# Patient Record
Sex: FEMALE | URBAN - METROPOLITAN AREA
[De-identification: names, ages, dates, MRNs, and addresses within clinical notes are randomized per-mention and may not be internally consistent; named-entity substitution may affect disease eponyms.]

---

## 2019-11-20 LAB — COLONOSCOPY, EXTERNAL: NORMAL

## 2022-12-02 ENCOUNTER — OFFICE VISIT (OUTPATIENT)
Dept: PRIMARY CARE CLINIC | Age: 62
End: 2022-12-02
Payer: OTHER GOVERNMENT

## 2022-12-02 VITALS
OXYGEN SATURATION: 99 % | TEMPERATURE: 97.6 F | DIASTOLIC BLOOD PRESSURE: 78 MMHG | SYSTOLIC BLOOD PRESSURE: 123 MMHG | HEIGHT: 66 IN | HEART RATE: 77 BPM | WEIGHT: 153.8 LBS | BODY MASS INDEX: 24.72 KG/M2 | RESPIRATION RATE: 14 BRPM

## 2022-12-02 DIAGNOSIS — E78.2 MIXED HYPERLIPIDEMIA: ICD-10-CM

## 2022-12-02 DIAGNOSIS — E03.9 ACQUIRED HYPOTHYROIDISM: Primary | ICD-10-CM

## 2022-12-02 DIAGNOSIS — Z86.2 HISTORY OF ANEMIA: ICD-10-CM

## 2022-12-02 RX ORDER — LEVOTHYROXINE SODIUM 112 UG/1
112 TABLET ORAL DAILY
COMMUNITY
Start: 2022-10-29 | End: 2022-12-02 | Stop reason: SDUPTHER

## 2022-12-02 RX ORDER — LEVOTHYROXINE SODIUM 112 UG/1
112 TABLET ORAL DAILY
Qty: 90 TABLET | Refills: 3 | Status: SHIPPED | OUTPATIENT
Start: 2022-12-02

## 2022-12-02 NOTE — PROGRESS NOTES
Chief Complaint   Patient presents with    Establish Care     Visit Vitals  /78 (BP 1 Location: Left upper arm, BP Patient Position: Sitting, BP Cuff Size: Small adult)   Pulse 77   Temp 97.6 °F (36.4 °C) (Temporal)   Resp 14   Ht 5' 6\" (1.676 m)   Wt 153 lb 12.8 oz (69.8 kg)   SpO2 99%   BMI 24.82 kg/m²     1. \"Have you been to the ER, urgent care clinic since your last visit? Hospitalized since your last visit? \" no    2. \"Have you seen or consulted any other health care providers outside of the [address] [place] [address] since your last visit? \" no     3. For patients aged 39-70: Has the patient had a colonoscopy / FIT/ Cologuard? Up to date       If the patient is female:    4. For patients aged 41-77: Has the patient had a mammogram within the past 2 years?  Due this month

## 2022-12-02 NOTE — PROGRESS NOTES
HPI     Chief Complaint   Patient presents with    [facility] Care     She is a 60 y.o. female who presents to [facility]. Establishing Care    Pmhx : hypothyroidism. Surghx reviewed. Sochx : quit smoking > 20 years ago, light smoker for 20 years. No significant alcohol use. Exercises at [employer] 4 days per day. Diet is healthy. Hx hypothyroidism. She reports compliance with medication. Denies any concerns or acute complaints. Mood and energy has been good. No exercise intolerance. She is UTD on cervical and colon cancer screenings. Has mammo scheduled. Retired. Moved to [facility] last year from [place]. Has grown children that live in [place] and [place].     - Chronic medical problems:  Past Medical History:   Diagnosis Date    Thyroid disease      - Current medications:   Current Outpatient Medications   Medication Sig    LevoxyL 112 mcg tablet Take 112 mcg by mouth daily. No current facility-administered medications for this visit. - Family history: History reviewed. No pertinent family history.   - Allergies: No Known Allergies  - Surgical history:   Past Surgical History:   Procedure Laterality Date    HX TONSILLECTOMY       - Social history (sexually active, occupation, smoker, etoh use, etc):   Social History     Socioeconomic History    Marital status: UNKNOWN     Spouse name: Not on file    Number of children: Not on file    Years of education: Not on file    Highest education level: Not on file   Occupational History    Not on file   Tobacco Use    Smoking status: Former     Types: Cigarettes    Smokeless tobacco: Never   Substance and Sexual Activity    Alcohol use: Not on file    Drug use: Never    Sexual activity: Not on file   Other Topics Concern    Not on file   Social History Narrative    Not on file     Social Determinants of Health     Financial Resource Strain: Not on file   Food Insecurity: Not on file   Transportation Needs: Not on file   Physical Activity: Not on file Stress: Not on file   Social Connections: Not on file   Intimate Partner Violence: Not on file   Housing Stability: Not on file         Review of Systems  General : Denies fever, chills, unintentional weight loss. Cardiac : Denies chest pain, shortness of breath, orthopnea, PND. Pulm : Denies coughing, hemoptysis, dyspnea. GI: Denies abd pain, vomiting, change in bowel movements, black/bloody stool. Neuro : Denies syncope or presyncope. Denies focal neuro symptoms. Reviewed PmHx, RxHx, FmHx, SocHx, AllgHx and updated and dated in the chart. Physical Exam:  Visit Vitals  /78 (BP 1 Location: Left upper arm, BP Patient Position: Sitting, BP Cuff Size: Small adult)   Pulse 77   Temp 97.6 °F (36.4 °C) (Temporal)   Resp 14   Ht 5' 6\" (1.676 m)   Wt 153 lb 12.8 oz (69.8 kg)   SpO2 99%   BMI 24.82 kg/m²     Physical Exam  Vitals reviewed. Constitutional:       Appearance: Normal appearance. HENT:      Head: Normocephalic and atraumatic. Right Ear: Tympanic membrane, ear canal and external ear normal.      Left Ear: Tympanic membrane, ear canal and external ear normal.   Eyes:      Conjunctiva/sclera: Conjunctivae normal.      Pupils: Pupils are equal, round, and reactive to light. Cardiovascular:      Rate and Rhythm: Normal rate and regular rhythm. Pulses: Normal pulses. Heart sounds: Normal heart sounds. Pulmonary:      Effort: Pulmonary effort is normal.      Breath sounds: Normal breath sounds. Musculoskeletal:      Cervical back: Neck supple. No tenderness. Lymphadenopathy:      Cervical: No cervical adenopathy. Neurological:      Mental Status: She is alert. Assessment / Plan     Diagnoses and all orders for this visit:    1. Acquired hypothyroidism  -     METABOLIC PANEL, COMPREHENSIVE; Future  -     THYROID CASCADE PROFILE; Future    2. History of anemia  -     CBC WITH AUTOMATED DIFF; Future    3. Mixed hyperlipidemia  -     LIPID PANEL;  Future    Other orders  -     LevoxyL 112 mcg tablet; Take 1 Tablet by mouth daily. Previous notes reviewed. Labs and follow up as indicated. Refill levothyroxine. Follow up 12 months or sooner prn. Obtain medical records    I have discussed the diagnosis with the patient and the intended plan as seen in the above orders.       Bridgette Friday, DO

## 2022-12-08 ENCOUNTER — TELEPHONE (OUTPATIENT)
Dept: PRIMARY CARE CLINIC | Age: 62
End: 2022-12-08

## 2022-12-09 ENCOUNTER — PATIENT MESSAGE (OUTPATIENT)
Dept: PRIMARY CARE CLINIC | Age: 62
End: 2022-12-09

## 2022-12-09 RX ORDER — LEVOTHYROXINE SODIUM 112 UG/1
112 TABLET ORAL DAILY
Qty: 90 TABLET | Refills: 3 | Status: SHIPPED | OUTPATIENT
Start: 2022-12-09

## 2022-12-09 NOTE — TELEPHONE ENCOUNTER
From: Bria Ramirez  To: Ayaka Magdaleno DO  Sent: 12/9/2022 11:57 AM EST  Subject: Medication    Hi Dr. Reinaldo Greenberg, Thank you for the message regarding my thyroid test results. I was taking one tablet M-F, then a 1/2 tablet Saturday and Sunday. Should I continue that dosage? Also, [facility] ([place] is my primary pharmacy, [facility] is secondary. Did my prescription go to [facility]? Its ok if it did, but in the future Id prefer [facility]. Thank you!  Ribeiro

## 2022-12-14 DIAGNOSIS — E87.5 HYPERKALEMIA: ICD-10-CM

## 2022-12-15 LAB
ANION GAP SERPL CALC-SCNC: 4 MMOL/L (ref 5–15)
BUN SERPL-MCNC: 13 MG/DL (ref 6–20)
BUN/CREAT SERPL: 14 (ref 12–20)
CALCIUM SERPL-MCNC: 9.9 MG/DL (ref 8.5–10.1)
CHLORIDE SERPL-SCNC: 103 MMOL/L (ref 97–108)
CO2 SERPL-SCNC: 31 MMOL/L (ref 21–32)
CREAT SERPL-MCNC: 0.95 MG/DL (ref 0.55–1.02)
GLUCOSE SERPL-MCNC: 117 MG/DL (ref 65–100)
POTASSIUM SERPL-SCNC: 5.6 MMOL/L (ref 3.5–5.1)
SODIUM SERPL-SCNC: 138 MMOL/L (ref 136–145)

## 2022-12-19 LAB — MAMMOGRAPHY, EXTERNAL: NORMAL

## 2023-01-27 ENCOUNTER — PATIENT MESSAGE (OUTPATIENT)
Dept: PRIMARY CARE CLINIC | Age: 63
End: 2023-01-27

## 2023-01-27 DIAGNOSIS — E87.5 HYPERKALEMIA: Primary | ICD-10-CM

## 2023-01-27 NOTE — TELEPHONE ENCOUNTER
From: Armida Govea  To: Sabra Melgar DO  Sent: 1/27/2023 12:33 PM EST  Subject: APGVPX test results    Hi Dr. Jani Marquez, Im doing very well except for the higher potassium reading. Not happy about that! I have been following a low potassium diet for the last 4 weeks. Marlayvonne Sawyerrosa been keeping my potassium intake at about 2000mg per day. I am not taking any other medication except for my thyroid. Do you have any other suggestion to help besides my diet? Im concerned as I know what high potassium levels can lead to. My Dad and his mother both had kidney disease so I would like to get my levels more manageable.  Thanks so much, [employer]

## 2023-05-17 ENCOUNTER — PATIENT MESSAGE (OUTPATIENT)
Dept: PRIMARY CARE CLINIC | Facility: CLINIC | Age: 63
End: 2023-05-17

## 2023-05-18 RX ORDER — LEVOTHYROXINE SODIUM 112 UG/1
112 TABLET ORAL DAILY
Qty: 90 TABLET | Refills: 0 | Status: SHIPPED | OUTPATIENT
Start: 2023-05-18

## 2023-05-18 NOTE — TELEPHONE ENCOUNTER
From: Pablito Davies  To: Dr. Dhruv Dunbar: 5/17/2023 11:44 AM EDT  Subject: Script for Thyroid    Hi Dr. Arley Delatorre, I hope you are doing well. [facility] cant refill my prescription for my thyroid. Would you be able to send it to [facility]? I would really appreciate it. Thank you!   Domenica Fischer

## 2023-09-20 ENCOUNTER — PATIENT MESSAGE (OUTPATIENT)
Dept: PRIMARY CARE CLINIC | Facility: CLINIC | Age: 63
End: 2023-09-20

## 2023-12-06 LAB
BUN SERPL-MCNC: 12 MG/DL (ref 8–27)
BUN/CREAT SERPL: 15 (ref 12–28)
CALCIUM SERPL-MCNC: 9.6 MG/DL (ref 8.7–10.3)
CHLORIDE SERPL-SCNC: 101 MMOL/L (ref 96–106)
CO2 SERPL-SCNC: 24 MMOL/L (ref 20–29)
CREAT SERPL-MCNC: 0.82 MG/DL (ref 0.57–1)
EGFRCR SERPLBLD CKD-EPI 2021: 80 ML/MIN/1.73
GLUCOSE SERPL-MCNC: 100 MG/DL (ref 70–99)
POTASSIUM SERPL-SCNC: 5.2 MMOL/L (ref 3.5–5.2)
SODIUM SERPL-SCNC: 143 MMOL/L (ref 134–144)

## 2023-12-12 ENCOUNTER — OFFICE VISIT (OUTPATIENT)
Dept: PRIMARY CARE CLINIC | Facility: CLINIC | Age: 63
End: 2023-12-12
Payer: OTHER GOVERNMENT

## 2023-12-12 VITALS
RESPIRATION RATE: 16 BRPM | DIASTOLIC BLOOD PRESSURE: 81 MMHG | WEIGHT: 150 LBS | HEART RATE: 89 BPM | BODY MASS INDEX: 24.11 KG/M2 | SYSTOLIC BLOOD PRESSURE: 119 MMHG | HEIGHT: 66 IN | OXYGEN SATURATION: 99 % | TEMPERATURE: 97.1 F

## 2023-12-12 DIAGNOSIS — Z12.31 SCREENING MAMMOGRAM FOR BREAST CANCER: ICD-10-CM

## 2023-12-12 DIAGNOSIS — E78.2 MIXED HYPERLIPIDEMIA: ICD-10-CM

## 2023-12-12 DIAGNOSIS — E87.5 HYPERKALEMIA: Primary | ICD-10-CM

## 2023-12-12 DIAGNOSIS — E03.9 ACQUIRED HYPOTHYROIDISM: ICD-10-CM

## 2023-12-12 DIAGNOSIS — Z00.00 WELL WOMAN EXAM (NO GYNECOLOGICAL EXAM): ICD-10-CM

## 2023-12-12 DIAGNOSIS — E03.9 ACQUIRED HYPOTHYROIDISM: Primary | ICD-10-CM

## 2023-12-12 LAB
ALBUMIN SERPL-MCNC: 4.2 G/DL (ref 3.5–5)
ALBUMIN/GLOB SERPL: 1.5 (ref 1.1–2.2)
ALP SERPL-CCNC: 49 U/L (ref 45–117)
ALT SERPL-CCNC: 24 U/L (ref 12–78)
ANION GAP SERPL CALC-SCNC: 2 MMOL/L (ref 5–15)
AST SERPL-CCNC: 14 U/L (ref 15–37)
BILIRUB SERPL-MCNC: 0.5 MG/DL (ref 0.2–1)
BUN SERPL-MCNC: 16 MG/DL (ref 6–20)
BUN/CREAT SERPL: 18 (ref 12–20)
CALCIUM SERPL-MCNC: 9.5 MG/DL (ref 8.5–10.1)
CHLORIDE SERPL-SCNC: 105 MMOL/L (ref 97–108)
CHOLEST SERPL-MCNC: 263 MG/DL
CO2 SERPL-SCNC: 32 MMOL/L (ref 21–32)
CREAT SERPL-MCNC: 0.89 MG/DL (ref 0.55–1.02)
GLOBULIN SER CALC-MCNC: 2.8 G/DL (ref 2–4)
GLUCOSE SERPL-MCNC: 116 MG/DL (ref 65–100)
HDLC SERPL-MCNC: 73 MG/DL
HDLC SERPL: 3.6 (ref 0–5)
LDLC SERPL CALC-MCNC: 174.6 MG/DL (ref 0–100)
POTASSIUM SERPL-SCNC: 5.3 MMOL/L (ref 3.5–5.1)
PROT SERPL-MCNC: 7 G/DL (ref 6.4–8.2)
SODIUM SERPL-SCNC: 139 MMOL/L (ref 136–145)
TRIGL SERPL-MCNC: 77 MG/DL
TSH SERPL DL<=0.05 MIU/L-ACNC: 1.34 UIU/ML (ref 0.36–3.74)
VLDLC SERPL CALC-MCNC: 15.4 MG/DL

## 2023-12-12 PROCEDURE — 99396 PREV VISIT EST AGE 40-64: CPT | Performed by: FAMILY MEDICINE

## 2023-12-12 RX ORDER — LEVOTHYROXINE SODIUM 112 UG/1
112 TABLET ORAL DAILY
Qty: 90 TABLET | Refills: 3 | Status: SHIPPED | OUTPATIENT
Start: 2023-12-12

## 2023-12-12 SDOH — ECONOMIC STABILITY: FOOD INSECURITY: WITHIN THE PAST 12 MONTHS, THE FOOD YOU BOUGHT JUST DIDN'T LAST AND YOU DIDN'T HAVE MONEY TO GET MORE.: NEVER TRUE

## 2023-12-12 SDOH — ECONOMIC STABILITY: INCOME INSECURITY: HOW HARD IS IT FOR YOU TO PAY FOR THE VERY BASICS LIKE FOOD, HOUSING, MEDICAL CARE, AND HEATING?: NOT HARD AT ALL

## 2023-12-12 SDOH — ECONOMIC STABILITY: FOOD INSECURITY: WITHIN THE PAST 12 MONTHS, YOU WORRIED THAT YOUR FOOD WOULD RUN OUT BEFORE YOU GOT MONEY TO BUY MORE.: NEVER TRUE

## 2023-12-12 SDOH — ECONOMIC STABILITY: HOUSING INSECURITY
IN THE LAST 12 MONTHS, WAS THERE A TIME WHEN YOU DID NOT HAVE A STEADY PLACE TO SLEEP OR SLEPT IN A SHELTER (INCLUDING NOW)?: NO

## 2023-12-12 ASSESSMENT — PATIENT HEALTH QUESTIONNAIRE - PHQ9
SUM OF ALL RESPONSES TO PHQ QUESTIONS 1-9: 0
SUM OF ALL RESPONSES TO PHQ9 QUESTIONS 1 & 2: 0
SUM OF ALL RESPONSES TO PHQ QUESTIONS 1-9: 0
1. LITTLE INTEREST OR PLEASURE IN DOING THINGS: 0
2. FEELING DOWN, DEPRESSED OR HOPELESS: 0

## 2024-01-08 ENCOUNTER — APPOINTMENT (OUTPATIENT)
Facility: HOSPITAL | Age: 64
End: 2024-01-08
Payer: OTHER GOVERNMENT

## 2024-01-08 ENCOUNTER — HOSPITAL ENCOUNTER (EMERGENCY)
Facility: HOSPITAL | Age: 64
Discharge: HOME | End: 2024-01-08
Attending: EMERGENCY MEDICINE
Payer: OTHER GOVERNMENT

## 2024-01-08 VITALS
BODY MASS INDEX: 25.16 KG/M2 | RESPIRATION RATE: 18 BRPM | DIASTOLIC BLOOD PRESSURE: 70 MMHG | HEART RATE: 82 BPM | HEIGHT: 66 IN | TEMPERATURE: 98.5 F | OXYGEN SATURATION: 94 % | WEIGHT: 156.53 LBS | SYSTOLIC BLOOD PRESSURE: 127 MMHG

## 2024-01-08 DIAGNOSIS — M54.16 LUMBAR RADICULOPATHY: Primary | ICD-10-CM

## 2024-01-08 PROCEDURE — 73502 X-RAY EXAM HIP UNI 2-3 VIEWS: CPT

## 2024-01-08 PROCEDURE — 99283 EMERGENCY DEPT VISIT LOW MDM: CPT

## 2024-01-08 PROCEDURE — 72100 X-RAY EXAM L-S SPINE 2/3 VWS: CPT

## 2024-01-08 RX ORDER — METHYLPREDNISOLONE 4 MG/1
TABLET ORAL
Qty: 1 KIT | Refills: 0 | Status: SHIPPED | OUTPATIENT
Start: 2024-01-08 | End: 2024-01-14

## 2024-01-08 RX ORDER — CYCLOBENZAPRINE HCL 10 MG
10 TABLET ORAL 3 TIMES DAILY PRN
Qty: 21 TABLET | Refills: 0 | Status: SHIPPED | OUTPATIENT
Start: 2024-01-08 | End: 2024-01-18

## 2024-01-08 ASSESSMENT — PAIN SCALES - GENERAL: PAINLEVEL_OUTOF10: 2

## 2024-01-08 NOTE — ED NOTES
Patient stable at time of discharge. Reviewed discharge instructions, medications, and follow up with patient. Allowed time for questions. Patient verbalized understanding. Ambulatory out of the department with steady gait accompanied by family.

## 2024-01-08 NOTE — ED PROVIDER NOTES
[facility]  EMERGENCY DEPARTMENT ENCOUNTER      Pt Name: Constantine Stein  MRN: [id number]  Birthdate 1960  Date of evaluation: 1/8/2024  Provider: Pastor Corw MD      HISTORY OF PRESENT ILLNESS      HPI  63-year-old female presenting due to right hip pain.  She has been having the symptoms since Thursday.  The day before.  She says the pain started out just above her right buttock and then has been radiating laterally almost to the top of her right thigh and right groin.  It is made worse with movement.  She denies any numbness or subjective weakness.  No trauma.  No fever.  No loss of bladder or bowel control or saddle anesthesia.  She has been using Motrin and ibuprofen.      Nursing Notes were reviewed.    REVIEW OF SYSTEMS         Review of Systems  All systems reviewed are negative unless otherwise document in the HPI      PAST MEDICAL HISTORY     Past Medical History:   Diagnosis Date    Osteopenia     Thyroid disease          SURGICAL HISTORY       Past Surgical History:   Procedure Laterality Date    TONSILLECTOMY           CURRENT MEDICATIONS       Previous Medications    LEVOTHYROXINE (LEVOXYL) 112 MCG TABLET    Take 1 tablet by mouth daily       ALLERGIES     Shellfish allergy    FAMILY HISTORY     History reviewed. No pertinent family history.       SOCIAL HISTORY       Social History     Socioeconomic History    Marital status: Unknown     Spouse name: None    Number of children: None    Years of education: None    Highest education level: None   Tobacco Use    Smoking status: Former    Smokeless tobacco: Never   Vaping Use    Vaping Use: Never used   Substance and Sexual Activity    Alcohol use: Yes     Comment: one glass of wine a night    Drug use: Never     Social Determinants of Health     Financial Resource Strain: Low Risk  (12/12/2023)    Overall Financial Resource Strain (CARDIA)     Difficulty of Paying Living Expenses: Not hard at all   Food Insecurity: No Food Insecurity

## 2024-01-08 NOTE — DISCHARGE INSTRUCTIONS
Return with any worsening symptoms, if you are unable to urinate, feel numbness near your rectum or vagina, feel significant weakness in your legs, or anything else you find concerning.  In the meantime you can start with the muscle relaxer in addition of Motrin and Tylenol but if you are having significant relief I recommend starting the steroids.  In the meantime I have provided you with information to follow-up with orthopedics and neurosurgery

## 2024-01-23 ENCOUNTER — HOSPITAL ENCOUNTER (OUTPATIENT)
Facility: HOSPITAL | Age: 64
Discharge: HOME | End: 2024-01-26
Attending: FAMILY MEDICINE
Payer: OTHER GOVERNMENT

## 2024-01-23 DIAGNOSIS — Z12.31 SCREENING MAMMOGRAM FOR BREAST CANCER: ICD-10-CM

## 2024-01-23 PROCEDURE — 77063 BREAST TOMOSYNTHESIS BI: CPT

## 2024-07-03 ENCOUNTER — OFFICE VISIT (OUTPATIENT)
Age: 64
End: 2024-07-03

## 2024-07-03 VITALS
OXYGEN SATURATION: 100 % | HEART RATE: 83 BPM | BODY MASS INDEX: 24.21 KG/M2 | SYSTOLIC BLOOD PRESSURE: 154 MMHG | DIASTOLIC BLOOD PRESSURE: 82 MMHG | TEMPERATURE: 98.4 F | WEIGHT: 150 LBS

## 2024-07-03 DIAGNOSIS — R30.0 DYSURIA: ICD-10-CM

## 2024-07-03 DIAGNOSIS — N39.0 ACUTE UTI: Primary | ICD-10-CM

## 2024-07-03 DIAGNOSIS — N89.8 VAGINAL DISCHARGE: ICD-10-CM

## 2024-07-03 LAB
BILIRUBIN, URINE, POC: NEGATIVE
BLOOD URINE, POC: ABNORMAL
GLUCOSE URINE, POC: NEGATIVE
KETONES, URINE, POC: NEGATIVE
LEUKOCYTE ESTERASE, URINE, POC: ABNORMAL
NITRITE, URINE, POC: NEGATIVE
PH, URINE, POC: 6 (ref 4.6–8)
PROTEIN,URINE, POC: NEGATIVE
SPECIFIC GRAVITY, URINE, POC: 1.02 (ref 1–1.03)
URINALYSIS CLARITY, POC: CLEAR
URINALYSIS COLOR, POC: YELLOW
UROBILINOGEN, POC: ABNORMAL

## 2024-07-03 RX ORDER — NICOTINE 14MG/24HR
1 PATCH, TRANSDERMAL 24 HOURS TRANSDERMAL
COMMUNITY

## 2024-07-03 RX ORDER — ASCORBIC ACID 500 MG
500 TABLET ORAL DAILY
COMMUNITY

## 2024-07-03 RX ORDER — NITROFURANTOIN 25; 75 MG/1; MG/1
100 CAPSULE ORAL 2 TIMES DAILY
Qty: 14 CAPSULE | Refills: 0 | Status: SHIPPED | OUTPATIENT
Start: 2024-07-03 | End: 2024-07-10

## 2024-07-03 NOTE — PATIENT INSTRUCTIONS
Testing today is consistent with urinary tract infection  No fevers/chills or flank pain to suggest ascending infection or complications at this time  Will treat with Macrobid as directed  Urine culture sent for confirmation, will contact you in 2-3 days with results  We will also send vaginal swab to test for BV, yeast, trichomonas, we will contact you in 2-3 days with results  Continue to drink lots of fluids  Follow up here or with PCP if symptoms persist  Go to the ED with any high fevers/chills, severe back/flank pain, or if vomiting and unable to tolerate fluids/medication

## 2024-07-03 NOTE — PROGRESS NOTES
Constantine Stein (:  1960) is a 63 y.o. female,New patient, here for evaluation of the following chief complaint(s):  Dysuria (Dysuria, vaginal pain, and green vaginal discharge x4 days)      Assessment & Plan :  Visit Diagnoses and Associated Orders       Acute UTI    -  Primary    nitrofurantoin, macrocrystal-monohydrate, (MACROBID) 100 MG capsule [37440]      Urine culture (clean catch) [63050 Custom]           Dysuria        AMB POC URINALYSIS DIP STICK AUTO W/O MICRO [42618 CPT(R)]      Urine culture (clean catch) [29719 Custom]           Vaginal discharge        Nuswab Vaginitis (VG) [HDK9294 Custom]   - Future Order         ORDERS WITHOUT AN ASSOCIATED DIAGNOSIS    ascorbic acid (VITAMIN C) 500 MG tablet [664]      Cyanocobalamin ER 2000 MCG TBCR [2015]      MAGNESIUM PO [85810]      Multiple Vitamin (MULTIVITAMIN ADULT PO) [834716]      Saccharomyces boulardii (PROBIOTIC) 250 MG CAPS [120948]            Testing today is consistent with urinary tract infection, 1+ leukocytes and small blood  No fevers/chills or flank pain to suggest ascending infection or complications at this time  Will treat with Macrobid as directed  Urine culture sent for confirmation, will contact you in 2-3 days with results  We will also send vaginal swab to test for BV, yeast, trichomonas, we will contact you in 2-3 days with results.  She declines testing for chlamydia or gonorrhea  Continue to drink lots of fluids  Follow up here or with PCP if symptoms persist  Go to the ED with any high fevers/chills, severe back/flank pain, or if vomiting and unable to tolerate fluids/medication       Subjective :    Dysuria          63 y.o. female presents with symptoms of vaginal irritation, dysuria and vaginal discharge for the past 4 days.  She denies any fevers, chills, body aches or fatigue, no back or flank pain.  She does report some thin, stringy vaginal discharge, describes this as a white to green in color.  Noticed the green

## 2024-07-04 LAB
BACTERIA SPEC CULT: NORMAL
CC UR VC: NORMAL
SERVICE CMNT-IMP: NORMAL

## 2024-07-06 LAB
A VAGINAE DNA VAG QL NAA+PROBE: NORMAL SCORE
BVAB2 DNA VAG QL NAA+PROBE: NORMAL SCORE
C ALBICANS DNA VAG QL NAA+PROBE: NEGATIVE
C GLABRATA DNA VAG QL NAA+PROBE: NEGATIVE
MEGA1 DNA VAG QL NAA+PROBE: NORMAL SCORE
SPECIMEN SOURCE: NORMAL
T VAGINALIS RRNA SPEC QL NAA+PROBE: NEGATIVE

## 2024-12-03 ENCOUNTER — OFFICE VISIT (OUTPATIENT)
Dept: PRIMARY CARE CLINIC | Facility: CLINIC | Age: 64
End: 2024-12-03
Payer: MEDICARE

## 2024-12-03 VITALS
TEMPERATURE: 97.8 F | HEART RATE: 78 BPM | RESPIRATION RATE: 18 BRPM | DIASTOLIC BLOOD PRESSURE: 76 MMHG | WEIGHT: 155.4 LBS | BODY MASS INDEX: 24.98 KG/M2 | HEIGHT: 66 IN | OXYGEN SATURATION: 99 % | SYSTOLIC BLOOD PRESSURE: 121 MMHG

## 2024-12-03 DIAGNOSIS — E03.9 ACQUIRED HYPOTHYROIDISM: ICD-10-CM

## 2024-12-03 DIAGNOSIS — M79.605 LEFT LEG PAIN: ICD-10-CM

## 2024-12-03 DIAGNOSIS — E03.9 ACQUIRED HYPOTHYROIDISM: Primary | ICD-10-CM

## 2024-12-03 DIAGNOSIS — E87.5 HYPERKALEMIA: ICD-10-CM

## 2024-12-03 PROCEDURE — 1090F PRES/ABSN URINE INCON ASSESS: CPT | Performed by: FAMILY MEDICINE

## 2024-12-03 PROCEDURE — G8400 PT W/DXA NO RESULTS DOC: HCPCS | Performed by: FAMILY MEDICINE

## 2024-12-03 PROCEDURE — 99213 OFFICE O/P EST LOW 20 MIN: CPT | Performed by: FAMILY MEDICINE

## 2024-12-03 PROCEDURE — G8419 CALC BMI OUT NRM PARAM NOF/U: HCPCS | Performed by: FAMILY MEDICINE

## 2024-12-03 PROCEDURE — 3017F COLORECTAL CA SCREEN DOC REV: CPT | Performed by: FAMILY MEDICINE

## 2024-12-03 PROCEDURE — 1036F TOBACCO NON-USER: CPT | Performed by: FAMILY MEDICINE

## 2024-12-03 PROCEDURE — 1123F ACP DISCUSS/DSCN MKR DOCD: CPT | Performed by: FAMILY MEDICINE

## 2024-12-03 PROCEDURE — G8427 DOCREV CUR MEDS BY ELIG CLIN: HCPCS | Performed by: FAMILY MEDICINE

## 2024-12-03 PROCEDURE — G8484 FLU IMMUNIZE NO ADMIN: HCPCS | Performed by: FAMILY MEDICINE

## 2024-12-03 ASSESSMENT — PATIENT HEALTH QUESTIONNAIRE - PHQ9
SUM OF ALL RESPONSES TO PHQ QUESTIONS 1-9: 0
SUM OF ALL RESPONSES TO PHQ QUESTIONS 1-9: 0
2. FEELING DOWN, DEPRESSED OR HOPELESS: NOT AT ALL
1. LITTLE INTEREST OR PLEASURE IN DOING THINGS: NOT AT ALL
SUM OF ALL RESPONSES TO PHQ9 QUESTIONS 1 & 2: 0
SUM OF ALL RESPONSES TO PHQ QUESTIONS 1-9: 0
SUM OF ALL RESPONSES TO PHQ QUESTIONS 1-9: 0

## 2024-12-03 NOTE — PROGRESS NOTES
Health Decision Maker has been checked with the patient      AI form was signed    Chief Complaint   Patient presents with    Established New Doctor    Medication Refill     Thyroid medication refill - bloodwork       \"Have you been to the ER, urgent care clinic since your last visit?  Hospitalized since your last visit?\"    Went to ER in 1/2024 for hip pain. Uc in July 2024    “Have you seen or consulted any other health care providers outside of [facility] since your last visit?”    NO      Vitals:    12/03/24 1059   Resp: 18   Temp: 97.8 °F (36.6 °C)   TempSrc: Oral   Weight: 70.5 kg (155 lb 6.4 oz)   Height: 1.676 m (5' 6\")      Depression: Not at risk (12/12/2023)    PHQ-2     PHQ-2 Score: 0          “Have you had a colorectal cancer screening such as a colonoscopy/FIT/Cologuard?    YES - Type: Colonoscopy - Where: in [place] Nurse/CMA to request most recent records if not in the chart     Date of last Colonoscopy: 11/20/2019  No cologuard on file  No FIT/FOBT on file   No flexible sigmoidoscopy on file         Click Here for Release of Records Request    Chart reviewed: immunizations are documented.   Immunization History   Administered Date(s) Administered    COVID-19, PFIZER PURPLE top, DILUTE for use, (age 12 y+), 30mcg/0.3mL 04/20/2021, 05/11/2021

## 2024-12-03 NOTE — PROGRESS NOTES
HPI     Chief Complaint   Patient presents with    Established New Doctor    Medication Refill     Thyroid medication refill - bloodwork       History of Present Illness  The patient is a 64-year-old female who is coming in to establish care.    She has been managing her hypothyroidism with generic levothyroxine for over two decades.    She has not received an influenza vaccine in recent years.    Despite maintaining an active lifestyle, she struggles with weight loss.    She experiences discomfort in her left hip, which she attributes to her iliotibial (IT) band. Prolonged sitting results in stiffness upon standing, but mobility improves once she starts moving. She reports no pain in the buttocks or sciatica. Occasionally, she experiences pain when getting in and out of a car. She has not sought medical attention for this issue. She is mindful of not overexerting herself during gym workouts and has been using massage therapy as a form of treatment.     Reviewed PmHx, RxHx, FmHx, SocHx, AllgHx and updated and dated in the chart.    Physical Exam:  /76   Pulse 78   Temp 97.8 °F (36.6 °C) (Oral)   Resp 18   Ht 1.676 m (5' 6\")   Wt 70.5 kg (155 lb 6.4 oz)   SpO2 99%   BMI 25.08 kg/m²     Physical Exam  WNWD NAD  RRR no murmur  CTA no wheezes ronchi rales  She has tenderness along lateral aspect of L upper leg, no gross deformity, no pain with IR/ ER of L hip, strength and sensation intact, minimal tenderness to palpation over L greater trochanter       Results  Imaging  Right hip x-ray showed no fracture and some arthritis in the back.        No results found for this or any previous visit (from the past 12 hour(s)).}        Assessment / Plan       ICD-10-CM    1. Acquired hypothyroidism  E03.9 TSH     Comprehensive Metabolic Panel      2. Left leg pain  M79.605            Assessment & Plan  1. Hypothyroidism.  She has been on generic levothyroxine for over 20 years. Thyroid levels will be checked

## 2024-12-04 LAB
ALBUMIN SERPL-MCNC: 4 G/DL (ref 3.5–5)
ALBUMIN/GLOB SERPL: 1.4 (ref 1.1–2.2)
ALP SERPL-CCNC: 53 U/L (ref 45–117)
ALT SERPL-CCNC: 23 U/L (ref 12–78)
ANION GAP SERPL CALC-SCNC: 6 MMOL/L (ref 2–12)
ANION GAP SERPL CALC-SCNC: 6 MMOL/L (ref 2–12)
AST SERPL-CCNC: 16 U/L (ref 15–37)
BILIRUB SERPL-MCNC: 0.4 MG/DL (ref 0.2–1)
BUN SERPL-MCNC: 19 MG/DL (ref 6–20)
BUN SERPL-MCNC: 20 MG/DL (ref 6–20)
BUN/CREAT SERPL: 25 (ref 12–20)
BUN/CREAT SERPL: 25 (ref 12–20)
CALCIUM SERPL-MCNC: 9.4 MG/DL (ref 8.5–10.1)
CALCIUM SERPL-MCNC: 9.7 MG/DL (ref 8.5–10.1)
CHLORIDE SERPL-SCNC: 104 MMOL/L (ref 97–108)
CHLORIDE SERPL-SCNC: 105 MMOL/L (ref 97–108)
CO2 SERPL-SCNC: 30 MMOL/L (ref 21–32)
CO2 SERPL-SCNC: 30 MMOL/L (ref 21–32)
CREAT SERPL-MCNC: 0.76 MG/DL (ref 0.55–1.02)
CREAT SERPL-MCNC: 0.79 MG/DL (ref 0.55–1.02)
GLOBULIN SER CALC-MCNC: 2.8 G/DL (ref 2–4)
GLUCOSE SERPL-MCNC: 102 MG/DL (ref 65–100)
GLUCOSE SERPL-MCNC: 103 MG/DL (ref 65–100)
POTASSIUM SERPL-SCNC: 4.6 MMOL/L (ref 3.5–5.1)
POTASSIUM SERPL-SCNC: 4.8 MMOL/L (ref 3.5–5.1)
PROT SERPL-MCNC: 6.8 G/DL (ref 6.4–8.2)
SODIUM SERPL-SCNC: 140 MMOL/L (ref 136–145)
SODIUM SERPL-SCNC: 141 MMOL/L (ref 136–145)
TSH SERPL DL<=0.05 MIU/L-ACNC: 0.58 UIU/ML (ref 0.36–3.74)

## 2024-12-27 ENCOUNTER — PATIENT MESSAGE (OUTPATIENT)
Dept: PRIMARY CARE CLINIC | Facility: CLINIC | Age: 64
End: 2024-12-27

## 2024-12-27 DIAGNOSIS — E03.9 ACQUIRED HYPOTHYROIDISM: ICD-10-CM

## 2024-12-27 RX ORDER — LEVOTHYROXINE SODIUM 112 UG/1
112 TABLET ORAL DAILY
Qty: 90 TABLET | Refills: 3 | Status: SHIPPED | OUTPATIENT
Start: 2024-12-27

## 2025-01-01 DIAGNOSIS — Z12.31 ENCOUNTER FOR SCREENING MAMMOGRAM FOR MALIGNANT NEOPLASM OF BREAST: ICD-10-CM

## 2025-01-01 DIAGNOSIS — Z00.00 MEDICARE ANNUAL WELLNESS VISIT, INITIAL: Primary | ICD-10-CM

## 2025-01-01 DIAGNOSIS — R73.09 ELEVATED GLUCOSE: ICD-10-CM

## 2025-01-02 DIAGNOSIS — E03.9 ACQUIRED HYPOTHYROIDISM: ICD-10-CM

## 2025-01-02 RX ORDER — LEVOTHYROXINE SODIUM 112 UG/1
112 TABLET ORAL DAILY
Qty: 90 TABLET | Refills: 3 | Status: SHIPPED | OUTPATIENT
Start: 2025-01-02

## 2025-01-24 ENCOUNTER — HOSPITAL ENCOUNTER (OUTPATIENT)
Facility: HOSPITAL | Age: 65
Discharge: HOME | End: 2025-01-27
Payer: MEDICARE

## 2025-01-24 DIAGNOSIS — Z12.31 ENCOUNTER FOR SCREENING MAMMOGRAM FOR MALIGNANT NEOPLASM OF BREAST: ICD-10-CM

## 2025-01-24 PROCEDURE — 77063 BREAST TOMOSYNTHESIS BI: CPT

## 2025-02-03 DIAGNOSIS — R73.09 ELEVATED GLUCOSE: ICD-10-CM

## 2025-02-03 DIAGNOSIS — Z00.00 MEDICARE ANNUAL WELLNESS VISIT, INITIAL: ICD-10-CM

## 2025-02-03 LAB
CHOLEST SERPL-MCNC: 246 MG/DL
ERYTHROCYTE [DISTWIDTH] IN BLOOD BY AUTOMATED COUNT: 11.8 % (ref 11.5–14.5)
EST. AVERAGE GLUCOSE BLD GHB EST-MCNC: 114 MG/DL
HBA1C MFR BLD: 5.6 % (ref 4–5.6)
HCT VFR BLD AUTO: 47 % (ref 35–47)
HDLC SERPL-MCNC: 70 MG/DL
HDLC SERPL: 3.5 (ref 0–5)
HGB BLD-MCNC: 15.5 G/DL (ref 11.5–16)
LDLC SERPL CALC-MCNC: 156.8 MG/DL (ref 0–100)
MCH RBC QN AUTO: 30.2 PG (ref 26–34)
MCHC RBC AUTO-ENTMCNC: 33 G/DL (ref 30–36.5)
MCV RBC AUTO: 91.4 FL (ref 80–99)
NRBC # BLD: 0 K/UL (ref 0–0.01)
NRBC BLD-RTO: 0 PER 100 WBC
PLATELET # BLD AUTO: 209 K/UL (ref 150–400)
PMV BLD AUTO: 9.7 FL (ref 8.9–12.9)
RBC # BLD AUTO: 5.14 M/UL (ref 3.8–5.2)
TRIGL SERPL-MCNC: 96 MG/DL
VLDLC SERPL CALC-MCNC: 19.2 MG/DL
WBC # BLD AUTO: 3.9 K/UL (ref 3.6–11)

## 2025-02-07 SDOH — HEALTH STABILITY: PHYSICAL HEALTH: ON AVERAGE, HOW MANY MINUTES DO YOU ENGAGE IN EXERCISE AT THIS LEVEL?: 60 MIN

## 2025-02-07 SDOH — ECONOMIC STABILITY: INCOME INSECURITY: IN THE LAST 12 MONTHS, WAS THERE A TIME WHEN YOU WERE NOT ABLE TO PAY THE MORTGAGE OR RENT ON TIME?: NO

## 2025-02-07 SDOH — HEALTH STABILITY: PHYSICAL HEALTH: ON AVERAGE, HOW MANY DAYS PER WEEK DO YOU ENGAGE IN MODERATE TO STRENUOUS EXERCISE (LIKE A BRISK WALK)?: 6 DAYS

## 2025-02-07 SDOH — ECONOMIC STABILITY: FOOD INSECURITY: WITHIN THE PAST 12 MONTHS, THE FOOD YOU BOUGHT JUST DIDN'T LAST AND YOU DIDN'T HAVE MONEY TO GET MORE.: NEVER TRUE

## 2025-02-07 SDOH — ECONOMIC STABILITY: TRANSPORTATION INSECURITY
IN THE PAST 12 MONTHS, HAS THE LACK OF TRANSPORTATION KEPT YOU FROM MEDICAL APPOINTMENTS OR FROM GETTING MEDICATIONS?: NO

## 2025-02-07 SDOH — ECONOMIC STABILITY: TRANSPORTATION INSECURITY
IN THE PAST 12 MONTHS, HAS LACK OF TRANSPORTATION KEPT YOU FROM MEETINGS, WORK, OR FROM GETTING THINGS NEEDED FOR DAILY LIVING?: NO

## 2025-02-07 SDOH — ECONOMIC STABILITY: FOOD INSECURITY: WITHIN THE PAST 12 MONTHS, YOU WORRIED THAT YOUR FOOD WOULD RUN OUT BEFORE YOU GOT MONEY TO BUY MORE.: NEVER TRUE

## 2025-02-07 ASSESSMENT — LIFESTYLE VARIABLES
HOW OFTEN DURING THE LAST YEAR HAVE YOU NEEDED AN ALCOHOLIC DRINK FIRST THING IN THE MORNING TO GET YOURSELF GOING AFTER A NIGHT OF HEAVY DRINKING: NEVER
HOW MANY STANDARD DRINKS CONTAINING ALCOHOL DO YOU HAVE ON A TYPICAL DAY: 1 OR 2
HOW OFTEN DO YOU HAVE SIX OR MORE DRINKS ON ONE OCCASION: 1
HOW MANY STANDARD DRINKS CONTAINING ALCOHOL DO YOU HAVE ON A TYPICAL DAY: 1
HOW OFTEN DO YOU HAVE A DRINK CONTAINING ALCOHOL: 5
HAVE YOU OR SOMEONE ELSE BEEN INJURED AS A RESULT OF YOUR DRINKING: NO
HOW OFTEN DURING THE LAST YEAR HAVE YOU HAD A FEELING OF GUILT OR REMORSE AFTER DRINKING: NEVER
HOW OFTEN DURING THE LAST YEAR HAVE YOU NEEDED AN ALCOHOLIC DRINK FIRST THING IN THE MORNING TO GET YOURSELF GOING AFTER A NIGHT OF HEAVY DRINKING: NEVER
HOW OFTEN DURING THE LAST YEAR HAVE YOU BEEN UNABLE TO REMEMBER WHAT HAPPENED THE NIGHT BEFORE BECAUSE YOU HAD BEEN DRINKING: NEVER
HOW OFTEN DURING THE LAST YEAR HAVE YOU FOUND THAT YOU WERE NOT ABLE TO STOP DRINKING ONCE YOU HAD STARTED: NEVER
HAS A RELATIVE, FRIEND, DOCTOR, OR ANOTHER HEALTH PROFESSIONAL EXPRESSED CONCERN ABOUT YOUR DRINKING OR SUGGESTED YOU CUT DOWN: NO
HOW OFTEN DURING THE LAST YEAR HAVE YOU BEEN UNABLE TO REMEMBER WHAT HAPPENED THE NIGHT BEFORE BECAUSE YOU HAD BEEN DRINKING: NEVER
HOW OFTEN DURING THE LAST YEAR HAVE YOU HAD A FEELING OF GUILT OR REMORSE AFTER DRINKING: NEVER
HOW OFTEN DO YOU HAVE A DRINK CONTAINING ALCOHOL: 4 OR MORE TIMES A WEEK
HOW OFTEN DURING THE LAST YEAR HAVE YOU FOUND THAT YOU WERE NOT ABLE TO STOP DRINKING ONCE YOU HAD STARTED: NEVER
HAS A RELATIVE, FRIEND, DOCTOR, OR ANOTHER HEALTH PROFESSIONAL EXPRESSED CONCERN ABOUT YOUR DRINKING OR SUGGESTED YOU CUT DOWN: NO
HAVE YOU OR SOMEONE ELSE BEEN INJURED AS A RESULT OF YOUR DRINKING: NO
HOW OFTEN DURING THE LAST YEAR HAVE YOU FAILED TO DO WHAT WAS NORMALLY EXPECTED FROM YOU BECAUSE OF DRINKING: NEVER
HOW OFTEN DURING THE LAST YEAR HAVE YOU FAILED TO DO WHAT WAS NORMALLY EXPECTED FROM YOU BECAUSE OF DRINKING: NEVER

## 2025-02-07 ASSESSMENT — PATIENT HEALTH QUESTIONNAIRE - PHQ9
2. FEELING DOWN, DEPRESSED OR HOPELESS: NOT AT ALL
SUM OF ALL RESPONSES TO PHQ QUESTIONS 1-9: 0
1. LITTLE INTEREST OR PLEASURE IN DOING THINGS: NOT AT ALL
SUM OF ALL RESPONSES TO PHQ QUESTIONS 1-9: 0
SUM OF ALL RESPONSES TO PHQ9 QUESTIONS 1 & 2: 0
SUM OF ALL RESPONSES TO PHQ QUESTIONS 1-9: 0
SUM OF ALL RESPONSES TO PHQ QUESTIONS 1-9: 0

## 2025-02-10 ENCOUNTER — OFFICE VISIT (OUTPATIENT)
Dept: PRIMARY CARE CLINIC | Facility: CLINIC | Age: 65
End: 2025-02-10

## 2025-02-10 ENCOUNTER — TELEPHONE (OUTPATIENT)
Dept: PRIMARY CARE CLINIC | Facility: CLINIC | Age: 65
End: 2025-02-10

## 2025-02-10 VITALS
RESPIRATION RATE: 18 BRPM | BODY MASS INDEX: 24.27 KG/M2 | DIASTOLIC BLOOD PRESSURE: 78 MMHG | OXYGEN SATURATION: 99 % | TEMPERATURE: 97.8 F | WEIGHT: 151 LBS | HEART RATE: 91 BPM | SYSTOLIC BLOOD PRESSURE: 124 MMHG | HEIGHT: 66 IN

## 2025-02-10 DIAGNOSIS — N89.8 VAGINAL ITCHING: ICD-10-CM

## 2025-02-10 DIAGNOSIS — Z78.0 POST-MENOPAUSAL: ICD-10-CM

## 2025-02-10 DIAGNOSIS — Z12.11 SCREENING FOR COLON CANCER: ICD-10-CM

## 2025-02-10 DIAGNOSIS — Z00.00 WELCOME TO MEDICARE PREVENTIVE VISIT: Primary | ICD-10-CM

## 2025-02-10 RX ORDER — FLUCONAZOLE 150 MG/1
150 TABLET ORAL ONCE
Qty: 1 TABLET | Refills: 0 | Status: SHIPPED | OUTPATIENT
Start: 2025-02-10 | End: 2025-02-10

## 2025-02-10 NOTE — TELEPHONE ENCOUNTER
Called patient back. Informed her that we did not have any documentation about dr zacarias or any one here calling her. I asked her what time the phone call came through ad the call back number. She said it was dr zacarias that left a message this afternoon and gave our office number to call back.    I told her I will check with dr zacarias and call her back

## 2025-02-10 NOTE — PROGRESS NOTES
Medicare Annual Wellness Visit    Constantine Stein is here for Medicare AWV    Assessment & Plan   Welcome to Medicare preventive visit - Called patient to discuss EKG further. Borderline for L atrial enlargement. Could do echo. Left VM for patient to call back to discuss further.   -     Welcome to Medicare (IPPE) Screening EKG  Screening for colon cancer  -     AFL - Ganesh Leone MD, Gastroenterology, [place] ([facility])  Post-menopausal  -     DEXA BONE DENSITY AXIAL SKELETON; Future  Vaginal Itching   - Sent in Diflucan 1x dose.    - Offered referral to GYN to consider topical estrogen. She is not interested in it at this time.   - She states will try Diflucan first then states she will come back for exam if not improving, discussed evaluation to rule out eczema, lichen sclerosus, etc.      No follow-ups on file.     Subjective     HLD - ASCVD risk 5.4%. She takes fish oil.   Vaginal itching - Using a Revaree OTC suppository. Has dryness and some itching without discharge. No dysuria/ hematuria. Not interested in topical estrogen.     Patient's complete Health Risk Assessment and screening values have been reviewed and are found in Flowsheets. The following problems were reviewed today and where indicated follow up appointments were made and/or referrals ordered.    - PNA vaccine declines  - Flu vaccine declines  - COVID Vaccine declines  - Colon cancer screening, 2019, due   - PAP UTD  - Mammo UTD  - DEXA     No Positive Risk Factors identified today.                                    Objective   Vitals:    02/10/25 0851   BP: 124/78   Pulse: 91   Resp: 18   Temp: 97.8 °F (36.6 °C)   TempSrc: Oral   SpO2: 99%   Weight: 68.5 kg (151 lb)   Height: 1.676 m (5' 6\")      Body mass index is 24.37 kg/m².          WNWD, NAD  RRR, no murmur  CTA, no wheezes/ ronchi/ rales  Abd soft, nttp  Tms nml, pharynx nml, nose nml  No cervical LAD  No edema  No focal deficits  Mood/ affect nml     POC EKG -  Sinus, 72,

## 2025-02-10 NOTE — PROGRESS NOTES
Health Decision Maker has been checked with the patient   Primary Decision Maker: Jake Stein - Spouse - [phone]     AI form was signed    Chief Complaint   Patient presents with    Medicare AWV       \"Have you been to the ER, urgent care clinic since your last visit?  Hospitalized since your last visit?\"    NO    “Have you seen or consulted any other health care providers outside of [facility] since your last visit?”    NO      Vitals:    02/10/25 0851   Temp: 97.8 °F (36.6 °C)   TempSrc: Oral   Weight: 68.5 kg (151 lb)   Height: 1.676 m (5' 6\")      Depression: Not at risk (2/7/2025)    PHQ-2     PHQ-2 Score: 0          “Have you had a colorectal cancer screening such as a colonoscopy/FIT/Cologuard?    NO    Date of last Colonoscopy: 11/20/2019  No cologuard on file  No FIT/FOBT on file   No flexible sigmoidoscopy on file         Click Here for Release of Records Request    Chart reviewed: immunizations are documented.   Immunization History   Administered Date(s) Administered    COVID-19, PFIZER PURPLE top, DILUTE for use, (age 12 y+), 30mcg/0.3mL 04/20/2021, 05/11/2021

## 2025-02-10 NOTE — TELEPHONE ENCOUNTER
Patient said she received a call and a VM from Dr. Arreola saying to call her back. Pt is returning that phone call

## 2025-02-14 ENCOUNTER — HOSPITAL ENCOUNTER (OUTPATIENT)
Facility: HOSPITAL | Age: 65
Discharge: HOME | End: 2025-02-17
Payer: MEDICARE

## 2025-02-14 DIAGNOSIS — Z78.0 POST-MENOPAUSAL: ICD-10-CM

## 2025-02-14 PROCEDURE — 77080 DXA BONE DENSITY AXIAL: CPT

## 2025-03-11 ENCOUNTER — HOSPITAL ENCOUNTER (EMERGENCY)
Facility: HOSPITAL | Age: 65
Discharge: HOME | End: 2025-03-11
Attending: EMERGENCY MEDICINE
Payer: MEDICARE

## 2025-03-11 ENCOUNTER — APPOINTMENT (OUTPATIENT)
Facility: HOSPITAL | Age: 65
End: 2025-03-11
Payer: MEDICARE

## 2025-03-11 VITALS
SYSTOLIC BLOOD PRESSURE: 132 MMHG | DIASTOLIC BLOOD PRESSURE: 73 MMHG | HEART RATE: 78 BPM | TEMPERATURE: 98.1 F | HEIGHT: 66 IN | OXYGEN SATURATION: 99 % | WEIGHT: 151.01 LBS | RESPIRATION RATE: 16 BRPM | BODY MASS INDEX: 24.27 KG/M2

## 2025-03-11 DIAGNOSIS — R31.9 URINARY TRACT INFECTION WITH HEMATURIA, SITE UNSPECIFIED: Primary | ICD-10-CM

## 2025-03-11 DIAGNOSIS — N95.2 ATROPHIC VAGINITIS: ICD-10-CM

## 2025-03-11 DIAGNOSIS — N39.0 URINARY TRACT INFECTION WITH HEMATURIA, SITE UNSPECIFIED: Primary | ICD-10-CM

## 2025-03-11 DIAGNOSIS — R93.89 ABNORMAL FINDING ON CT SCAN: ICD-10-CM

## 2025-03-11 DIAGNOSIS — E83.51 HYPOCALCEMIA: ICD-10-CM

## 2025-03-11 LAB
ALBUMIN SERPL-MCNC: 2.4 G/DL (ref 3.5–5)
ALBUMIN/GLOB SERPL: 1.1 (ref 1.1–2.2)
ALP SERPL-CCNC: 33 U/L (ref 45–117)
ALT SERPL-CCNC: 15 U/L (ref 12–78)
ANION GAP SERPL CALC-SCNC: 7 MMOL/L (ref 2–12)
ANION GAP SERPL CALC-SCNC: 9 MMOL/L (ref 2–12)
APPEARANCE UR: CLEAR
AST SERPL-CCNC: 13 U/L (ref 15–37)
BACTERIA URNS QL MICRO: NEGATIVE /HPF
BASOPHILS # BLD: 0.04 K/UL (ref 0–0.1)
BASOPHILS NFR BLD: 0.6 % (ref 0–1)
BILIRUB SERPL-MCNC: 0.3 MG/DL (ref 0.2–1)
BILIRUB UR QL: NEGATIVE
BUN SERPL-MCNC: 12 MG/DL (ref 6–20)
BUN SERPL-MCNC: 14 MG/DL (ref 6–20)
BUN/CREAT SERPL: 19 (ref 12–20)
BUN/CREAT SERPL: 25 (ref 12–20)
CALCIUM SERPL-MCNC: 6.4 MG/DL (ref 8.5–10.1)
CALCIUM SERPL-MCNC: 8 MG/DL (ref 8.5–10.1)
CHLORIDE SERPL-SCNC: 100 MMOL/L (ref 97–108)
CHLORIDE SERPL-SCNC: 113 MMOL/L (ref 97–108)
CO2 SERPL-SCNC: 23 MMOL/L (ref 21–32)
CO2 SERPL-SCNC: 28 MMOL/L (ref 21–32)
COLOR UR: ABNORMAL
COMMENT:: NORMAL
CREAT SERPL-MCNC: 0.48 MG/DL (ref 0.55–1.02)
CREAT SERPL-MCNC: 0.72 MG/DL (ref 0.55–1.02)
DIFFERENTIAL METHOD BLD: NORMAL
EKG ATRIAL RATE: 71 BPM
EKG DIAGNOSIS: NORMAL
EKG P AXIS: 80 DEGREES
EKG P-R INTERVAL: 140 MS
EKG Q-T INTERVAL: 406 MS
EKG QRS DURATION: 88 MS
EKG QTC CALCULATION (BAZETT): 441 MS
EKG R AXIS: 74 DEGREES
EKG T AXIS: 61 DEGREES
EKG VENTRICULAR RATE: 71 BPM
EOSINOPHIL # BLD: 0.09 K/UL (ref 0–0.4)
EOSINOPHIL NFR BLD: 1.3 % (ref 0–7)
EPITH CASTS URNS QL MICRO: ABNORMAL /LPF
ERYTHROCYTE [DISTWIDTH] IN BLOOD BY AUTOMATED COUNT: 12 % (ref 11.5–14.5)
GLOBULIN SER CALC-MCNC: 2.1 G/DL (ref 2–4)
GLUCOSE SERPL-MCNC: 78 MG/DL (ref 65–100)
GLUCOSE SERPL-MCNC: 91 MG/DL (ref 65–100)
GLUCOSE UR STRIP.AUTO-MCNC: NEGATIVE MG/DL
HCT VFR BLD AUTO: 42.6 % (ref 35–47)
HGB BLD-MCNC: 14.2 G/DL (ref 11.5–16)
HGB UR QL STRIP: ABNORMAL
IMM GRANULOCYTES # BLD AUTO: 0.02 K/UL (ref 0–0.04)
IMM GRANULOCYTES NFR BLD AUTO: 0.3 % (ref 0–0.5)
KETONES UR QL STRIP.AUTO: NEGATIVE MG/DL
LEUKOCYTE ESTERASE UR QL STRIP.AUTO: ABNORMAL
LYMPHOCYTES # BLD: 1.31 K/UL (ref 0.8–3.5)
LYMPHOCYTES NFR BLD: 19.2 % (ref 12–49)
MAGNESIUM SERPL-MCNC: 1.8 MG/DL (ref 1.6–2.4)
MCH RBC QN AUTO: 29.8 PG (ref 26–34)
MCHC RBC AUTO-ENTMCNC: 33.3 G/DL (ref 30–36.5)
MCV RBC AUTO: 89.5 FL (ref 80–99)
MONOCYTES # BLD: 0.44 K/UL (ref 0–1)
MONOCYTES NFR BLD: 6.4 % (ref 5–13)
MUCOUS THREADS URNS QL MICRO: ABNORMAL /LPF
NEUTS SEG # BLD: 4.93 K/UL (ref 1.8–8)
NEUTS SEG NFR BLD: 72.2 % (ref 32–75)
NITRITE UR QL STRIP.AUTO: NEGATIVE
NRBC # BLD: 0 K/UL (ref 0–0.01)
NRBC BLD-RTO: 0 PER 100 WBC
PH UR STRIP: 7 (ref 5–8)
PLATELET # BLD AUTO: 184 K/UL (ref 150–400)
PMV BLD AUTO: 9 FL (ref 8.9–12.9)
POTASSIUM SERPL-SCNC: 2.8 MMOL/L (ref 3.5–5.1)
POTASSIUM SERPL-SCNC: 3.4 MMOL/L (ref 3.5–5.1)
PROT SERPL-MCNC: 4.5 G/DL (ref 6.4–8.2)
PROT UR STRIP-MCNC: NEGATIVE MG/DL
RBC # BLD AUTO: 4.76 M/UL (ref 3.8–5.2)
RBC #/AREA URNS HPF: ABNORMAL /HPF (ref 0–5)
SODIUM SERPL-SCNC: 135 MMOL/L (ref 136–145)
SODIUM SERPL-SCNC: 145 MMOL/L (ref 136–145)
SP GR UR REFRACTOMETRY: 1.01 (ref 1–1.03)
SPECIMEN HOLD: NORMAL
SPECIMEN HOLD: NORMAL
UROBILINOGEN UR QL STRIP.AUTO: 0.2 EU/DL (ref 0.2–1)
WBC # BLD AUTO: 6.8 K/UL (ref 3.6–11)
WBC URNS QL MICRO: ABNORMAL /HPF (ref 0–4)

## 2025-03-11 PROCEDURE — 81001 URINALYSIS AUTO W/SCOPE: CPT

## 2025-03-11 PROCEDURE — 80053 COMPREHEN METABOLIC PANEL: CPT

## 2025-03-11 PROCEDURE — 74177 CT ABD & PELVIS W/CONTRAST: CPT

## 2025-03-11 PROCEDURE — 83735 ASSAY OF MAGNESIUM: CPT

## 2025-03-11 PROCEDURE — 36415 COLL VENOUS BLD VENIPUNCTURE: CPT

## 2025-03-11 PROCEDURE — 99285 EMERGENCY DEPT VISIT HI MDM: CPT

## 2025-03-11 PROCEDURE — 85025 COMPLETE CBC W/AUTO DIFF WBC: CPT

## 2025-03-11 PROCEDURE — 93005 ELECTROCARDIOGRAM TRACING: CPT

## 2025-03-11 PROCEDURE — 6360000004 HC RX CONTRAST MEDICATION

## 2025-03-11 RX ORDER — CEPHALEXIN 500 MG/1
500 CAPSULE ORAL 2 TIMES DAILY
Qty: 10 CAPSULE | Refills: 0 | Status: SHIPPED | OUTPATIENT
Start: 2025-03-11 | End: 2025-03-16

## 2025-03-11 RX ORDER — IOPAMIDOL 755 MG/ML
100 INJECTION, SOLUTION INTRAVASCULAR
Status: COMPLETED | OUTPATIENT
Start: 2025-03-11 | End: 2025-03-11

## 2025-03-11 RX ADMIN — IOPAMIDOL 100 ML: 755 INJECTION, SOLUTION INTRAVENOUS at 14:35

## 2025-03-11 ASSESSMENT — PAIN DESCRIPTION - LOCATION: LOCATION: VAGINA

## 2025-03-11 ASSESSMENT — PAIN SCALES - GENERAL: PAINLEVEL_OUTOF10: 3

## 2025-03-11 NOTE — ED TRIAGE NOTES
ED triage note: ambulatory with a steady gait. Patient reports woke up this morning with pelvic pain and blood in urine with small clots. Patient reports it was also painful when urinating.

## 2025-03-11 NOTE — DISCHARGE INSTRUCTIONS
You are diagnosed today with a UTI.  Your CBC did not indicate white count which is reassuring.  Your basic metabolic panel did result in a higher calcium level than on the first time that we ran the blood and a higher potassium level than the first time that we ran your blood work.  As I discussed the corrected level that we used was within normal limits, but I still would follow-up with your primary care about this.  The CT scan of your abdomen pelvis indicated no acute abnormality.  Some incidental findings that were seen on the CT scan include a subcentimeter low-density right hepatic lobe lesion and a small nonspecific low-density right sided kidney lesion.  These are of nonemergent etiologies and would require follow-up with your primary care provider.  Please take the antibiotic that have prescribed in its entirety and as instructed.  If you develop any worsening symptoms or persistent symptoms or change in your symptoms then you should come back to the ER.  For the vaginitis/atrophic vaginitis, like we discussed I would recommend using over-the-counter products such as gels or cream for relief-potentially vagisil or the coconut oil/cream you were discussing.  And I would recommend following up with your family doctor about this.     Thank you for allowing us to provide you with medical care today.  We realize that you have many choices for your emergency care needs.  We thank you for choosing [facility].  Please choose us in the future for any continued health care needs.     The exam and treatment you received in the Emergency Department were for an emergent problem and are not intended as complete care. It is important that you follow up with a doctor, nurse practitioner, or physician assistant for ongoing care. If your symptoms worsen or you do not improve as expected and you are unable to reach your usual health care provider, you should return to the Emergency Department. We are available 24 hours a

## 2025-03-11 NOTE — ED PROVIDER NOTES
Never true     Ran Out of Food in the Last Year: Never true   Transportation Needs: No Transportation Needs (2/7/2025)    PRAPARE - Transportation     Lack of Transportation (Medical): No     Lack of Transportation (Non-Medical): No   Physical Activity: Sufficiently Active (2/7/2025)    Exercise Vital Sign     Days of Exercise per Week: 6 days     Minutes of Exercise per Session: 60 min   Housing Stability: Low Risk  (2/7/2025)    Housing Stability Vital Sign     Unable to Pay for Housing in the Last Year: No     Number of Times Moved in the Last Year: 0     Homeless in the Last Year: No           PHYSICAL EXAM    (up to 7 for level 4, 8 or more for level 5)     ED Triage Vitals   BP Systolic BP Percentile Diastolic BP Percentile Temp Temp src Pulse Respirations SpO2   03/11/25 1057 -- -- 03/11/25 1057 -- 03/11/25 1057 03/11/25 1057 03/11/25 1057   136/77   98.1 °F (36.7 °C)  78 16 99 %      Height Weight - Scale         03/11/25 1045 03/11/25 1045         1.676 m (5' 6\") 68.5 kg (151 lb 0.2 oz)             Body mass index is 24.37 kg/m².    Physical Exam  Vitals and nursing note reviewed.   Constitutional:       General: She is not in acute distress.     Appearance: Normal appearance. She is not ill-appearing, toxic-appearing or diaphoretic.   HENT:      Head: Normocephalic and atraumatic.      Right Ear: External ear normal.      Left Ear: External ear normal.      Nose: Nose normal.      Mouth/Throat:      Mouth: Mucous membranes are moist.   Eyes:      Extraocular Movements: Extraocular movements intact.      Pupils: Pupils are equal, round, and reactive to light.   Cardiovascular:      Rate and Rhythm: Normal rate and regular rhythm.      Heart sounds: Normal heart sounds.   Pulmonary:      Effort: Pulmonary effort is normal.      Breath sounds: Normal breath sounds.   Abdominal:      General: Abdomen is flat. There is no distension.      Palpations: Abdomen is soft.      Tenderness: There is abdominal

## 2025-03-20 ENCOUNTER — ANESTHESIA EVENT (OUTPATIENT)
Facility: HOSPITAL | Age: 65
End: 2025-03-20
Payer: MEDICARE

## 2025-03-20 ENCOUNTER — HOSPITAL ENCOUNTER (OUTPATIENT)
Facility: HOSPITAL | Age: 65
Setting detail: OUTPATIENT SURGERY
Discharge: HOME | End: 2025-03-20
Attending: INTERNAL MEDICINE | Admitting: INTERNAL MEDICINE
Payer: MEDICARE

## 2025-03-20 ENCOUNTER — ANESTHESIA (OUTPATIENT)
Facility: HOSPITAL | Age: 65
End: 2025-03-20
Payer: MEDICARE

## 2025-03-20 VITALS
SYSTOLIC BLOOD PRESSURE: 134 MMHG | WEIGHT: 148.3 LBS | OXYGEN SATURATION: 99 % | HEART RATE: 73 BPM | HEIGHT: 66 IN | DIASTOLIC BLOOD PRESSURE: 62 MMHG | TEMPERATURE: 97.3 F | BODY MASS INDEX: 23.83 KG/M2 | RESPIRATION RATE: 19 BRPM

## 2025-03-20 PROCEDURE — 3700000000 HC ANESTHESIA ATTENDED CARE: Performed by: INTERNAL MEDICINE

## 2025-03-20 PROCEDURE — 3600007502: Performed by: INTERNAL MEDICINE

## 2025-03-20 PROCEDURE — 2580000003 HC RX 258: Performed by: NURSE ANESTHETIST, CERTIFIED REGISTERED

## 2025-03-20 PROCEDURE — 2709999900 HC NON-CHARGEABLE SUPPLY: Performed by: INTERNAL MEDICINE

## 2025-03-20 PROCEDURE — 88305 TISSUE EXAM BY PATHOLOGIST: CPT

## 2025-03-20 PROCEDURE — 7100000011 HC PHASE II RECOVERY - ADDTL 15 MIN: Performed by: INTERNAL MEDICINE

## 2025-03-20 PROCEDURE — 7100000010 HC PHASE II RECOVERY - FIRST 15 MIN: Performed by: INTERNAL MEDICINE

## 2025-03-20 PROCEDURE — 3700000001 HC ADD 15 MINUTES (ANESTHESIA): Performed by: INTERNAL MEDICINE

## 2025-03-20 PROCEDURE — 3600007512: Performed by: INTERNAL MEDICINE

## 2025-03-20 PROCEDURE — 6360000002 HC RX W HCPCS: Performed by: NURSE ANESTHETIST, CERTIFIED REGISTERED

## 2025-03-20 RX ORDER — SODIUM CHLORIDE 0.9 % (FLUSH) 0.9 %
5-40 SYRINGE (ML) INJECTION EVERY 12 HOURS SCHEDULED
Status: DISCONTINUED | OUTPATIENT
Start: 2025-03-20 | End: 2025-03-20 | Stop reason: HOSPADM

## 2025-03-20 RX ORDER — SODIUM CHLORIDE 9 MG/ML
INJECTION, SOLUTION INTRAVENOUS
Status: DISCONTINUED | OUTPATIENT
Start: 2025-03-20 | End: 2025-03-20 | Stop reason: SDUPTHER

## 2025-03-20 RX ORDER — LIDOCAINE HYDROCHLORIDE 20 MG/ML
INJECTION, SOLUTION EPIDURAL; INFILTRATION; INTRACAUDAL; PERINEURAL
Status: DISCONTINUED | OUTPATIENT
Start: 2025-03-20 | End: 2025-03-20 | Stop reason: SDUPTHER

## 2025-03-20 RX ORDER — SODIUM CHLORIDE 9 MG/ML
INJECTION, SOLUTION INTRAVENOUS CONTINUOUS
Status: DISCONTINUED | OUTPATIENT
Start: 2025-03-20 | End: 2025-03-20 | Stop reason: HOSPADM

## 2025-03-20 RX ORDER — SODIUM CHLORIDE 9 MG/ML
INJECTION, SOLUTION INTRAVENOUS PRN
Status: DISCONTINUED | OUTPATIENT
Start: 2025-03-20 | End: 2025-03-20 | Stop reason: HOSPADM

## 2025-03-20 RX ORDER — FERROUS SULFATE 7.5 MG/0.5
15 SYRINGE (EA) ORAL DAILY
COMMUNITY

## 2025-03-20 RX ORDER — SODIUM CHLORIDE 0.9 % (FLUSH) 0.9 %
5-40 SYRINGE (ML) INJECTION PRN
Status: DISCONTINUED | OUTPATIENT
Start: 2025-03-20 | End: 2025-03-20 | Stop reason: HOSPADM

## 2025-03-20 RX ADMIN — LIDOCAINE HYDROCHLORIDE 50 MG: 20 INJECTION, SOLUTION EPIDURAL; INFILTRATION; INTRACAUDAL; PERINEURAL at 10:06

## 2025-03-20 RX ADMIN — SODIUM CHLORIDE: 9 INJECTION, SOLUTION INTRAVENOUS at 09:58

## 2025-03-20 RX ADMIN — PROPOFOL 80 MG: 10 INJECTION, EMULSION INTRAVENOUS at 10:06

## 2025-03-20 RX ADMIN — PROPOFOL 50 MG: 10 INJECTION, EMULSION INTRAVENOUS at 10:13

## 2025-03-20 RX ADMIN — PROPOFOL 50 MG: 10 INJECTION, EMULSION INTRAVENOUS at 10:17

## 2025-03-20 RX ADMIN — PROPOFOL 20 MG: 10 INJECTION, EMULSION INTRAVENOUS at 10:07

## 2025-03-20 RX ADMIN — PROPOFOL 50 MG: 10 INJECTION, EMULSION INTRAVENOUS at 10:10

## 2025-03-20 RX ADMIN — PROPOFOL 50 MG: 10 INJECTION, EMULSION INTRAVENOUS at 10:21

## 2025-03-20 RX ADMIN — PROPOFOL 50 MG: 10 INJECTION, EMULSION INTRAVENOUS at 10:25

## 2025-03-20 ASSESSMENT — PAIN - FUNCTIONAL ASSESSMENT: PAIN_FUNCTIONAL_ASSESSMENT: 0-10

## 2025-03-20 NOTE — ANESTHESIA PRE PROCEDURE
Department of Anesthesiology  Preprocedure Note       Name:  Constantine Stein   Age:  64 y.o.  :  1960                                          MRN:  [id number]         Date:  3/20/2025      Surgeon: Surgeon(s):  Catracho Lopez MD    Procedure: Procedure(s):  COLONOSCOPY    Medications prior to admission:   Prior to Admission medications    Medication Sig Start Date End Date Taking? Authorizing Provider   Omega-3 Fatty Acids (FISH OIL) 300 MG CAPS Take by mouth   Yes Rosi Mir MD   ferrous sulfate (MATIAS-IN-SOL) 75 (15 Fe) MG/ML solution Take 1 mL by mouth daily   Yes Rosi Mir MD   levothyroxine (LEVOXYL) 112 MCG tablet Take 1 tablet by mouth daily 25  Yes Tita Arreola DO   ascorbic acid (VITAMIN C) 500 MG tablet Take 1 tablet by mouth daily   Yes Rosi Mir MD   Cyanocobalamin ER 2000 MCG TBCR Take 2,000 mcg by mouth 17  Yes Rosi Mir MD   MAGNESIUM PO Take 250 mg by mouth 13  Yes Rosi Mir MD   Multiple Vitamin (MULTIVITAMIN ADULT PO) Take 1 tablet by mouth daily 16  Yes Rosi Mir MD   Saccharomyces boulardii (PROBIOTIC) 250 MG CAPS Take 1 packet by mouth   Yes Rosi Mir MD       Current medications:    Current Facility-Administered Medications   Medication Dose Route Frequency Provider Last Rate Last Admin   • 0.9 % sodium chloride infusion   IntraVENous Continuous Catracho Lopez MD       • sodium chloride flush 0.9 % injection 5-40 mL  5-40 mL IntraVENous 2 times per day Catracho Lopez MD       • sodium chloride flush 0.9 % injection 5-40 mL  5-40 mL IntraVENous PRN Catracho Lopez MD       • 0.9 % sodium chloride infusion   IntraVENous PRN Catracho Lopez MD         Facility-Administered Medications Ordered in Other Encounters   Medication Dose Route Frequency Provider Last Rate Last Admin   • 0.9 % sodium chloride infusion   IntraVENous Continuous PRN Juliette Higuera APRN - CRNA   New Bag at 
LABGLOM >90 03/11/2025 02:48 PM    LABGLOM >60 12/12/2023 09:47 AM    LABGLOM 80 12/05/2023 12:10 PM    GLUCOSE 91 03/11/2025 02:48 PM    GLUCOSE 100 12/05/2023 12:10 PM    CALCIUM 8.0 03/11/2025 02:48 PM    BILITOT 0.3 03/11/2025 01:49 PM    ALKPHOS 33 03/11/2025 01:49 PM    ALKPHOS 47 12/07/2022 09:12 AM    AST 13 03/11/2025 01:49 PM    ALT 15 03/11/2025 01:49 PM       POC Tests: No results for input(s): \"POCGLU\", \"POCNA\", \"POCK\", \"POCCL\", \"POCBUN\", \"POCHEMO\", \"POCHCT\" in the last 72 hours.    Coags: No results found for: \"PROTIME\", \"INR\", \"APTT\"    HCG (If Applicable): No results found for: \"PREGTESTUR\", \"PREGSERUM\", \"HCG\", \"HCGQUANT\"     ABGs: No results found for: \"PHART\", \"PO2ART\", \"LXY8MXZ\", \"TCX1OOV\", \"BEART\", \"B9DBIFYP\"     Type & Screen (If Applicable):  No results found for: \"ABORH\", \"LABANTI\"    Drug/Infectious Status (If Applicable):  No results found for: \"HIV\", \"HEPCAB\"    COVID-19 Screening (If Applicable): No results found for: \"COVID19\"        Anesthesia Evaluation    Airway: Mallampati: II          Dental:          Pulmonary: breath sounds clear to auscultation                             Cardiovascular:            Rhythm: regular  Rate: normal                    Neuro/Psych:   (+) neuromuscular disease:            GI/Hepatic/Renal:             Endo/Other:    (+) hypothyroidism::..                 Abdominal:             Vascular:          Other Findings:       Anesthesia Plan      MAC     ASA 2             Anesthetic plan and risks discussed with patient.                    Ishmael Stubbs MD   3/20/2025

## 2025-03-20 NOTE — OP NOTE
[facility] [address] [address] [phone]               Colonoscopy Operative Report      Indications: Personal h/o colon polyps, last colonoscopy 5 yrs back    :  Catracho Lopez MD    Staff: Circulator: Jose Miguel Escobar, RN; Torie Bermudez RN  Circulator Assist: Jose Miguel Escobar, RN     Referring Provider: Tita Arreola DO    Sedation:  MAC anesthesia    Procedure Details:  After informed consent was obtained with all risks and benefits of procedure explained and preoperative exam completed, the patient was taken to the endoscopy suite and placed in the left lateral decubitus position.  Upon sequential sedation as per above, a digital rectal exam was performed  And was normal.  The Olympus videocolonoscope  was inserted in the rectum and carefully advanced to the cecum, which was identified by the ileocecal valve and appendiceal orifice.  The quality of preparation was good.  The colonoscope was slowly withdrawn with careful evaluation between folds. Retroflexion in the rectum was performed and was normal..     Findings:   Rectum: Medium sized internal hemorrhoids  Sigmoid:     -Diverticulosis  Descending Colon: 1  Sessile polyp, 2 mm in size; removed with cold biopsy forceps.   Transverse Colon: normal  Ascending Colon: normal  Cecum: normal  Terminal Ileum: not intubated    Interventions:  1 complete polypectomy were performed using cold biopsy forceps and the polyps were  retrieved    Specimen Removed:   ID Type Source Tests Collected by Time Destination   1 : Descending colon polyp Tissue Colon-Descending SURGICAL PATHOLOGY Catracho Lopez MD 3/20/2025 1019        EBL:  Minimal    Complications:  None; patient tolerated the procedure well.    Impression:  -Single small (2 mm) polyp found in the descending colon - removed and sent for pathology  -Mild sigmoid colon diverticulosis and medium sized internal hemorrhoids.     Recommendations:    -Resume normal medication(s).  -A high fiber diet with plenty of fluids (up to 8 glasses of water daily) is suggested to relieve these symptoms.  Metamucil, 1 tablespoon once or twice daily can be used to keep bowels regular if needed.  -Await pathology.  -Repeat colonoscopy in 5 years.      Discharge Disposition:  Home in the company of a  when able to ambulate.    Catracho Lopez MD  3/20/2025  10:26 AM

## 2025-03-20 NOTE — H&P
[facility] [address] [address] [phone]        History and Physical     NAME:  Constantine Stein   :  1960   MRN:  [id number]         HPI:  Constantine Stein is a 64 y.o. female here for colonoscopy. Patient has history of colon polyps. Last colonoscopy 5 years back. Maternal grandfather had colon cancer. No GI symptoms.     Past Surgical History:   Procedure Laterality Date    TONSILLECTOMY       Past Medical History:   Diagnosis Date    Arthritis     Carpal tunnel syndrome     Hypothyroidism     Osteopenia     Thyroid disease      Social History     Tobacco Use    Smoking status: Former     Current packs/day: 0.00     Types: Cigarettes     Quit date: 2000     Years since quittin.2    Smokeless tobacco: Never   Vaping Use    Vaping status: Never Used   Substance Use Topics    Alcohol use: Yes     Alcohol/week: 4.0 - 5.0 standard drinks of alcohol     Types: 4 - 5 Glasses of wine per week    Drug use: Never     No current facility-administered medications on file prior to encounter.     Current Outpatient Medications on File Prior to Encounter   Medication Sig Dispense Refill    Omega-3 Fatty Acids (FISH OIL) 300 MG CAPS Take by mouth      ferrous sulfate (MATIAS-IN-SOL) 75 (15 Fe) MG/ML solution Take 1 mL by mouth daily      levothyroxine (LEVOXYL) 112 MCG tablet Take 1 tablet by mouth daily 90 tablet 3    ascorbic acid (VITAMIN C) 500 MG tablet Take 1 tablet by mouth daily      Cyanocobalamin ER 2000 MCG TBCR Take 2,000 mcg by mouth      MAGNESIUM PO Take 250 mg by mouth      Multiple Vitamin (MULTIVITAMIN ADULT PO) Take 1 tablet by mouth daily      Saccharomyces boulardii (PROBIOTIC) 250 MG CAPS Take 1 packet by mouth       Allergies   Allergen Reactions    Shellfish Allergy Other (See Comments)     Pt states that she gets headaches      Family History   Problem Relation Age of Onset    Mental Illness Mother         Never formally diagnosed until this  year.  She is 91    Alcohol Abuse Father             Hearing Loss Father     High Blood Pressure Father     Kidney Disease Father     Cancer Brother         Thyroid Cancer    Osteoporosis Sister     High Blood Pressure Sister     Colon Cancer Maternal Grandfather     Kidney Disease Paternal Grandmother     Alcohol Abuse Brother         He doesn’t drink now    Substance Abuse Brother      Current Facility-Administered Medications   Medication Dose Route Frequency    0.9 % sodium chloride infusion   IntraVENous Continuous    sodium chloride flush 0.9 % injection 5-40 mL  5-40 mL IntraVENous 2 times per day    sodium chloride flush 0.9 % injection 5-40 mL  5-40 mL IntraVENous PRN    0.9 % sodium chloride infusion   IntraVENous PRN     Facility-Administered Medications Ordered in Other Encounters   Medication Dose Route Frequency    0.9 % sodium chloride infusion   IntraVENous Continuous PRN       PHYSICAL EXAM:    /61   Pulse 61   Temp 97.5 °F (36.4 °C) (Temporal)   Resp 11   Ht 1.676 m (5' 6\")   Wt 67.3 kg (148 lb 4.8 oz)   SpO2 99%   BMI 23.94 kg/m²     General: Alert, cooperative, no acute distress    HEENT: Atraumatic.  PERRLA, EOMI. Anicteric sclerae.  Lungs:  Comfortable breathing. No obvious use of accessory muscles. Not on oxygen  Abdomen: Soft, Non distended, Non tender. No hepatosplenomegaly  Extremities: No cyanosis or edema  Neurologic:  CN 2-12 grossly intact, Alert and oriented X 3.  No acute neurological distress   Psych:   Good insight. Not anxious nor agitated.     Assessment:   Personal history of colon polyps, last colonoscopy 5 years back    Plan:   Endoscopic procedure: Colonoscopy  Anesthesia plan: Monitored Anesthesia Care    Catracho Lopez MD  3/20/211604:04 AM

## 2025-03-20 NOTE — PROGRESS NOTES
Verified patient name and date of birth, scheduled procedure, and informed consent. Reviewed general discharge instructions and  information.  Assessed patient. Awake, alert, and oriented per baseline. Vital signs stable (see vital sign flowsheet). Respiratory status within defined limits, abdomen soft and non tender. Skin with in defined limits.     Initial RN admission and assessment performed and documented in Endoscopy navigator.     Patient evaluated by anesthesia in pre-procedure holding.     All procedural vital signs, airway assessment, and level of consciousness information monitored and recorded by anesthesia staff on the anesthesia record.     Report received from CRNA post procedure.  Patient transported to recovery area by RN.    Endoscopy post procedure time out was performed and specimens were verified with physician.    Endoscope was pre-cleaned at bedside immediately following procedure by DAPHNE.

## 2025-03-21 NOTE — ANESTHESIA POSTPROCEDURE EVALUATION
Department of Anesthesiology  Postprocedure Note    Patient: Constantine Stein  MRN: [id number]  YOB: 1960  Date of evaluation: 3/21/2025    Procedure Summary       Date: 03/20/25 Room / Location: [facility] ENDO 03 / [facility] ENDOSCOPY    Anesthesia Start: 1004 Anesthesia Stop: 1027    Procedure: COLONOSCOPY (Lower GI Region) Diagnosis:       Personal history of colon polyps, unspecified      (Personal history of colon polyps, unspecified [Z86.0100])    Surgeons: Catracho Lopez MD Responsible Provider: Ishmael Stubbs MD    Anesthesia Type: MAC ASA Status: 2            Anesthesia Type: MAC    Mckinley Phase I: Mckinley Score: 10    Mckinley Phase II: Mckinley Score: 10    Anesthesia Post Evaluation    Patient location during evaluation: bedside  Nausea & Vomiting: no nausea  Cardiovascular status: blood pressure returned to baseline  Respiratory status: acceptable  Hydration status: euvolemic    No notable events documented.

## 2025-04-07 ENCOUNTER — OFFICE VISIT (OUTPATIENT)
Dept: PRIMARY CARE CLINIC | Facility: CLINIC | Age: 65
End: 2025-04-07
Payer: MEDICARE

## 2025-04-07 VITALS
OXYGEN SATURATION: 99 % | DIASTOLIC BLOOD PRESSURE: 80 MMHG | SYSTOLIC BLOOD PRESSURE: 136 MMHG | HEART RATE: 77 BPM | HEIGHT: 66 IN | WEIGHT: 152.6 LBS | BODY MASS INDEX: 24.53 KG/M2 | RESPIRATION RATE: 18 BRPM | TEMPERATURE: 97.9 F

## 2025-04-07 DIAGNOSIS — R94.31 ABNORMAL EKG: Primary | ICD-10-CM

## 2025-04-07 DIAGNOSIS — E78.5 HYPERLIPIDEMIA, UNSPECIFIED HYPERLIPIDEMIA TYPE: ICD-10-CM

## 2025-04-07 PROCEDURE — 3017F COLORECTAL CA SCREEN DOC REV: CPT | Performed by: FAMILY MEDICINE

## 2025-04-07 PROCEDURE — 99213 OFFICE O/P EST LOW 20 MIN: CPT | Performed by: FAMILY MEDICINE

## 2025-04-07 PROCEDURE — G8399 PT W/DXA RESULTS DOCUMENT: HCPCS | Performed by: FAMILY MEDICINE

## 2025-04-07 PROCEDURE — 1090F PRES/ABSN URINE INCON ASSESS: CPT | Performed by: FAMILY MEDICINE

## 2025-04-07 PROCEDURE — 1036F TOBACCO NON-USER: CPT | Performed by: FAMILY MEDICINE

## 2025-04-07 PROCEDURE — G8427 DOCREV CUR MEDS BY ELIG CLIN: HCPCS | Performed by: FAMILY MEDICINE

## 2025-04-07 PROCEDURE — 1123F ACP DISCUSS/DSCN MKR DOCD: CPT | Performed by: FAMILY MEDICINE

## 2025-04-07 PROCEDURE — G8420 CALC BMI NORM PARAMETERS: HCPCS | Performed by: FAMILY MEDICINE

## 2025-04-07 ASSESSMENT — PATIENT HEALTH QUESTIONNAIRE - PHQ9
SUM OF ALL RESPONSES TO PHQ QUESTIONS 1-9: 0
SUM OF ALL RESPONSES TO PHQ QUESTIONS 1-9: 0
1. LITTLE INTEREST OR PLEASURE IN DOING THINGS: NOT AT ALL
SUM OF ALL RESPONSES TO PHQ QUESTIONS 1-9: 0
SUM OF ALL RESPONSES TO PHQ QUESTIONS 1-9: 0
2. FEELING DOWN, DEPRESSED OR HOPELESS: NOT AT ALL

## 2025-04-07 NOTE — PROGRESS NOTES
Health Decision Maker has been checked with the patient   Primary Decision Maker: Jake Stein - Spouse - [phone]     AI form was signed    Chief Complaint   Patient presents with    Follow-up     Cholesterol and EKG results       \"Have you been to the ER, urgent care clinic since your last visit?  Hospitalized since your last visit?\"    YES - When: approximately 4  weeks ago.  Where and Why: UTI .    “Have you seen or consulted any other health care providers outside of [facility] since your last visit?”    NO      Vitals:    04/07/25 1246   Resp: 18   Temp: 97.9 °F (36.6 °C)   TempSrc: Oral   SpO2: 99%   Weight: 69.2 kg (152 lb 9.6 oz)   Height: 1.676 m (5' 6\")      Depression: Not at risk (2/7/2025)    PHQ-2     PHQ-2 Score: 0              Click Here for Release of Records Request    Chart reviewed: immunizations are documented.   Immunization History   Administered Date(s) Administered    COVID-19, PFIZER PURPLE top, DILUTE for use, (age 12 y+), 30mcg/0.3mL 04/20/2021, 05/11/2021    Influenza Virus Vaccine 11/20/2014, 09/16/2015, 11/13/2016, 10/01/2017, 10/22/2018, 10/08/2019, 09/17/2020    Influenza, FLUCELVAX, (age 6 mo+), MDCK, Quadv PF, 0.5mL 09/13/2021    Pneumococcal, PPSV23, PNEUMOVAX 23, (age 2y+), SC/IM, 0.5mL 07/27/2015    TDaP, ADACEL (age 10y-64y), BOOSTRIX (age 10y+), IM, 0.5mL 01/12/2010, 09/17/2020    Zoster Recombinant (Shingrix) 12/23/2020, 02/27/2021      
Potential left atrial enlargement  - EKG in 02/2025 suggestive of left atrial enlargement  - EKG in 03/2025 revealed normal sinus rhythm with sinus arrhythmia, but also suggested possible left atrial enlargement  - Order echocardiogram to confirm if left atrial enlargement present  - Provided contact information to schedule at [facility] or [facility]    2. Elevated cholesterol levels  - LDL cholesterol level 156, ASCVD risk score 6.3, no need for cholesterol medication currently  - Continue dietary modifications and regular exercise; LDL decreased by 20 points since 2023  - Discussed calcium score CT scan for further cardiac risk evaluation, she declines at this time  - Consider statin therapy if cholesterol levels rise       I have discussed the diagnosis with the patient and the intended plan as seen in the above orders. The patient has received an after-visit summary and questions were answered concerning future plans.  I have discussed medication side effects and warnings with the patient as well.    The patient (or guardian, if applicable) and other individuals in attendance with the patient were advised that Artificial Intelligence will be utilized during this visit to record and process the conversation to generate a clinical note. The patient (or guardian, if applicable) and other individuals in attendance at the appointment consented to the use of AI, including the recording.       Melissa Arreola DO

## 2025-04-17 ENCOUNTER — HOSPITAL ENCOUNTER (OUTPATIENT)
Facility: HOSPITAL | Age: 65
Discharge: HOME | End: 2025-04-19
Payer: MEDICARE

## 2025-04-17 ENCOUNTER — RESULTS FOLLOW-UP (OUTPATIENT)
Dept: PRIMARY CARE CLINIC | Facility: CLINIC | Age: 65
End: 2025-04-17

## 2025-04-17 VITALS
WEIGHT: 152 LBS | HEIGHT: 66 IN | BODY MASS INDEX: 24.43 KG/M2 | DIASTOLIC BLOOD PRESSURE: 75 MMHG | SYSTOLIC BLOOD PRESSURE: 126 MMHG

## 2025-04-17 DIAGNOSIS — R94.31 ABNORMAL EKG: ICD-10-CM

## 2025-04-17 LAB
ECHO AO ARCH DIAM: 2 CM
ECHO AO ASC DIAM: 2.8 CM
ECHO AO ASCENDING AORTA INDEX: 1.57 CM/M2
ECHO AO ROOT DIAM: 2.6 CM
ECHO AO ROOT INDEX: 1.46 CM/M2
ECHO AV AREA PEAK VELOCITY: 2.3 CM2
ECHO AV AREA PEAK VELOCITY: 2.8 CM2
ECHO AV AREA VTI: 2.7 CM2
ECHO AV AREA/BSA VTI: 1.5 CM2/M2
ECHO AV MEAN GRADIENT: 3 MMHG
ECHO AV MEAN VELOCITY: 0.8 M/S
ECHO AV PEAK GRADIENT: 4 MMHG
ECHO AV PEAK GRADIENT: 6 MMHG
ECHO AV PEAK VELOCITY: 1 M/S
ECHO AV PEAK VELOCITY: 1.2 M/S
ECHO AV VTI: 21.9 CM
ECHO BSA: 1.79 M2
ECHO LA DIAMETER INDEX: 1.63 CM/M2
ECHO LA DIAMETER: 2.9 CM
ECHO LA TO AORTIC ROOT RATIO: 1.12
ECHO LA VOL A-L A2C: 28 ML (ref 22–52)
ECHO LA VOL A-L A4C: 26 ML (ref 22–52)
ECHO LA VOL BP: 25 ML (ref 22–52)
ECHO LA VOL MOD A2C: 28 ML (ref 22–52)
ECHO LA VOL MOD A4C: 23 ML (ref 22–52)
ECHO LA VOL/BSA BIPLANE: 14 ML/M2 (ref 16–34)
ECHO LA VOLUME AREA LENGTH: 28 ML
ECHO LA VOLUME INDEX A-L A2C: 16 ML/M2 (ref 16–34)
ECHO LA VOLUME INDEX A-L A4C: 15 ML/M2 (ref 16–34)
ECHO LA VOLUME INDEX AREA LENGTH: 16 ML/M2 (ref 16–34)
ECHO LA VOLUME INDEX MOD A2C: 16 ML/M2 (ref 16–34)
ECHO LA VOLUME INDEX MOD A4C: 13 ML/M2 (ref 16–34)
ECHO LV E' LATERAL VELOCITY: 9.69 CM/S
ECHO LV E' SEPTAL VELOCITY: 6.69 CM/S
ECHO LV EDV A2C: 51 ML
ECHO LV EDV A4C: 45 ML
ECHO LV EDV BP: 49 ML (ref 56–104)
ECHO LV EDV INDEX A4C: 25 ML/M2
ECHO LV EDV INDEX BP: 28 ML/M2
ECHO LV EDV NDEX A2C: 29 ML/M2
ECHO LV EF PHYSICIAN: 62 %
ECHO LV EJECTION FRACTION A2C: 64 %
ECHO LV EJECTION FRACTION A4C: 59 %
ECHO LV EJECTION FRACTION BIPLANE: 62 % (ref 55–100)
ECHO LV ESV A2C: 18 ML
ECHO LV ESV A4C: 19 ML
ECHO LV ESV BP: 19 ML (ref 19–49)
ECHO LV ESV INDEX A2C: 10 ML/M2
ECHO LV ESV INDEX A4C: 11 ML/M2
ECHO LV ESV INDEX BP: 11 ML/M2
ECHO LV FRACTIONAL SHORTENING: 45 % (ref 28–44)
ECHO LV INTERNAL DIMENSION DIASTOLE INDEX: 2.47 CM/M2
ECHO LV INTERNAL DIMENSION DIASTOLIC: 4.4 CM (ref 3.9–5.3)
ECHO LV INTERNAL DIMENSION SYSTOLIC INDEX: 1.35 CM/M2
ECHO LV INTERNAL DIMENSION SYSTOLIC: 2.4 CM
ECHO LV IVSD: 0.7 CM (ref 0.6–0.9)
ECHO LV MASS 2D: 92.1 G (ref 67–162)
ECHO LV MASS INDEX 2D: 51.7 G/M2 (ref 43–95)
ECHO LV POSTERIOR WALL DIASTOLIC: 0.7 CM (ref 0.6–0.9)
ECHO LV RELATIVE WALL THICKNESS RATIO: 0.32
ECHO LVOT AREA: 3.1 CM2
ECHO LVOT AV VTI INDEX: 0.86
ECHO LVOT DIAM: 2 CM
ECHO LVOT MEAN GRADIENT: 1 MMHG
ECHO LVOT PEAK GRADIENT: 3 MMHG
ECHO LVOT PEAK VELOCITY: 0.9 M/S
ECHO LVOT STROKE VOLUME INDEX: 33.2 ML/M2
ECHO LVOT SV: 59 ML
ECHO LVOT VTI: 18.8 CM
ECHO MV A VELOCITY: 1.1 M/S
ECHO MV E DECELERATION TIME (DT): 270.9 MS
ECHO MV E VELOCITY: 0.79 M/S
ECHO MV E/A RATIO: 0.72
ECHO MV E/E' LATERAL: 8.15
ECHO MV E/E' RATIO (AVERAGED): 9.98
ECHO MV E/E' SEPTAL: 11.81
ECHO MV REGURGITANT PEAK GRADIENT: 108 MMHG
ECHO MV REGURGITANT PEAK VELOCITY: 5.2 M/S
ECHO PV MAX VELOCITY: 0.7 M/S
ECHO PV PEAK GRADIENT: 2 MMHG
ECHO RV FREE WALL PEAK S': 12 CM/S
ECHO RV INTERNAL DIMENSION: 3.6 CM
ECHO RV TAPSE: 1.9 CM (ref 1.7–?)
ECHO TV REGURGITANT MAX VELOCITY: 2.6 M/S
ECHO TV REGURGITANT PEAK GRADIENT: 27 MMHG

## 2025-04-17 PROCEDURE — 93306 TTE W/DOPPLER COMPLETE: CPT

## (undated) DEVICE — FORCEP BX JUMBO 4 2.8 MMX240 CM 3.2 MM OVL CUP RADIAL JAW

## (undated) DEVICE — SUPPLEMENT DIGESTIVE H2O SOL GI-EASE